# Patient Record
Sex: MALE | Race: WHITE
[De-identification: names, ages, dates, MRNs, and addresses within clinical notes are randomized per-mention and may not be internally consistent; named-entity substitution may affect disease eponyms.]

---

## 2019-11-06 ENCOUNTER — HOSPITAL ENCOUNTER (OUTPATIENT)
Dept: HOSPITAL 47 - ORWHC2ENDO | Age: 74
Discharge: HOME | End: 2019-11-06
Attending: INTERNAL MEDICINE
Payer: COMMERCIAL

## 2019-11-06 VITALS — DIASTOLIC BLOOD PRESSURE: 53 MMHG | SYSTOLIC BLOOD PRESSURE: 109 MMHG | HEART RATE: 61 BPM

## 2019-11-06 VITALS — BODY MASS INDEX: 23.2 KG/M2

## 2019-11-06 VITALS — TEMPERATURE: 97.3 F

## 2019-11-06 VITALS — RESPIRATION RATE: 24 BRPM

## 2019-11-06 DIAGNOSIS — Z80.8: ICD-10-CM

## 2019-11-06 DIAGNOSIS — C34.12: Primary | ICD-10-CM

## 2019-11-06 DIAGNOSIS — Z88.0: ICD-10-CM

## 2019-11-06 DIAGNOSIS — I73.9: ICD-10-CM

## 2019-11-06 DIAGNOSIS — Z82.3: ICD-10-CM

## 2019-11-06 DIAGNOSIS — Z88.2: ICD-10-CM

## 2019-11-06 DIAGNOSIS — Z79.899: ICD-10-CM

## 2019-11-06 DIAGNOSIS — Z82.0: ICD-10-CM

## 2019-11-06 DIAGNOSIS — Z79.82: ICD-10-CM

## 2019-11-06 DIAGNOSIS — I10: ICD-10-CM

## 2019-11-06 DIAGNOSIS — J44.9: ICD-10-CM

## 2019-11-06 DIAGNOSIS — Z95.5: ICD-10-CM

## 2019-11-06 DIAGNOSIS — I25.2: ICD-10-CM

## 2019-11-06 DIAGNOSIS — Z97.2: ICD-10-CM

## 2019-11-06 DIAGNOSIS — Z87.891: ICD-10-CM

## 2019-11-06 DIAGNOSIS — K44.9: ICD-10-CM

## 2019-11-06 DIAGNOSIS — Z79.02: ICD-10-CM

## 2019-11-06 DIAGNOSIS — I25.10: ICD-10-CM

## 2019-11-06 DIAGNOSIS — E78.5: ICD-10-CM

## 2019-11-06 DIAGNOSIS — Z85.828: ICD-10-CM

## 2019-11-06 PROCEDURE — 31645 BRNCHSC W/THER ASPIR 1ST: CPT

## 2019-11-06 PROCEDURE — 87498 ENTEROVIRUS PROBE&REVRS TRNS: CPT

## 2019-11-06 PROCEDURE — 87634 RSV DNA/RNA AMP PROBE: CPT

## 2019-11-06 PROCEDURE — 87116 MYCOBACTERIA CULTURE: CPT

## 2019-11-06 PROCEDURE — 87496 CYTOMEG DNA AMP PROBE: CPT

## 2019-11-06 PROCEDURE — 87252 VIRUS INOCULATION TISSUE: CPT

## 2019-11-06 PROCEDURE — 87102 FUNGUS ISOLATION CULTURE: CPT

## 2019-11-06 PROCEDURE — 87070 CULTURE OTHR SPECIMN AEROBIC: CPT

## 2019-11-06 PROCEDURE — 87205 SMEAR GRAM STAIN: CPT

## 2019-11-06 PROCEDURE — 87206 SMEAR FLUORESCENT/ACID STAI: CPT

## 2019-11-06 PROCEDURE — 31624 DX BRONCHOSCOPE/LAVAGE: CPT

## 2019-11-06 PROCEDURE — 87502 INFLUENZA DNA AMP PROBE: CPT

## 2019-11-06 PROCEDURE — 88108 CYTOPATH CONCENTRATE TECH: CPT

## 2019-11-06 PROCEDURE — 88305 TISSUE EXAM BY PATHOLOGIST: CPT

## 2019-11-06 PROCEDURE — 88341 IMHCHEM/IMCYTCHM EA ADD ANTB: CPT

## 2019-11-06 PROCEDURE — 94640 AIRWAY INHALATION TREATMENT: CPT

## 2019-11-06 PROCEDURE — 87798 DETECT AGENT NOS DNA AMP: CPT

## 2019-11-06 PROCEDURE — 87529 HSV DNA AMP PROBE: CPT

## 2019-11-06 PROCEDURE — 88342 IMHCHEM/IMCYTCHM 1ST ANTB: CPT

## 2019-11-06 NOTE — P.PCN
Date of Procedure: 11/06/19


Preoperative Diagnosis: 


EDISON opacity/infiltrating mass with cavitation


Postoperative Diagnosis: 


EDISON opacity/infiltrating mass with cavitation


Procedure(s) Performed: 


Flexible bronchoscopy and a BAL of the EDISON


Anesthesia: VIKAS


Surgeon: Juvenal Cha


Pathology: other


Condition: stable


Disposition: same day


Description of Procedure: 


This is a flexible bronchoscopy that was done in the endoscopy suite under 

conscious sedation.  The patient is a left upper lobe cavitating infiltrating 

mass/opacity.  A bronchioloalveolar lavage of the left upper lobe was done 

during the procedure





This procedure was done under conscious sedation.  Anesthetic agents was 

administered by anesthesia the bedside.  After achieving adequate sedation, the 

flexible bronchoscope was inserted through the left nostril was advanced with 

Doppler airway.  Examination of the posterior oropharynx, larynx, epiglottis and

vocal cords and all of the upper airway structures was done and they were all 

within normal limits.  Epiglottis was within normal and the cords were symmetric

and and sharp without any nodules or lesions.  There was some mobility without 

any abnormalities.  A total of 2 mL of 1% lidocaine was applied to the vocal 

cords and following that the bronchoscope was advanced into the upper trachea.  

Examination of the tracheal bronchial tree was done.  This involved the entire 

trachea, lydia, bilateral mainstem bronchi, right upper lobe bronchus, right 

upper lobe bronchus, right lower lobe bronchus, left upper lobe bronchus, left 

lower lobe bronchus along with his various segments and subsegments.  All of 

these were treated and within normal limits.  The bronchoscope was moved to the 

anterior segment of the left upper lobe and a bronchoalveolar lavage was done.  

A total of 80 mL of fluid was infused and 25 mL was aspirated.  This was done 

without any complication.  The aspirate was turbid and yellowish in color.  No 

other bronchial tumors.  No foreign bodies.  No lesions identified.  The 

bronchoscope was removed and the patient was transferred recovery in stable 

condition.  The samples will be sent for microbial analysis and cytology.  PET 

scan is to follow regarding this left upper lobe mass.

## 2019-11-09 ENCOUNTER — HOSPITAL ENCOUNTER (OUTPATIENT)
Dept: HOSPITAL 47 - RADPETMAIN | Age: 74
Discharge: HOME | End: 2019-11-09
Attending: INTERNAL MEDICINE
Payer: COMMERCIAL

## 2019-11-09 DIAGNOSIS — R91.8: Primary | ICD-10-CM

## 2019-11-09 PROCEDURE — 78815 PET IMAGE W/CT SKULL-THIGH: CPT

## 2019-11-11 NOTE — PE
EXAMINATION TYPE: PET CT fusion skull to thigh

 

DATE OF EXAM: 11/9/2019

 

COMPARISON: CT low dose dated 10/10/2019

 

HISTORY: Abnormal low dose CT chest dated 10/10/2019   

 

TECHNIQUE:  Following the intravenous administration of 13.11 mCi of F-18 FDG, whole body images are 
performed from the skull base to the midthigh.  Images are reviewed on the computer in the coronal, a
xial, and sagittal planes.  Reconstructed rotating images are created on independent workstation and 
reviewed on the computer.   A localization and attenuation correction CT is performed in conjunction 
with the PET scan.

 

SCAN: Initial staging

 

FINDINGS:

Mediastinal background: 1.56

Abdominal background: 2.67 

 

SKULL BASE AND NECK:  No suspicious uptake.

 

CHEST, MEDIASTINUM, AND HILAR REGION: There is severe background pulmonary emphysema. The known left 
upper lobe pulmonary mass with the most solid inferior portion measuring 3.9 cm on the prior examinat
ion (cavitary mass measuring 6.1 x 2.7 cm in total) has a maximum SUV of 12.7. Multiple satellite pul
monary nodules are again seen with the most spiculated measuring 1.1 cm and having an SUV of 10.2.

 

Prevascular lymph node measuring 9 mm on the prior exam and has an SUV of 3.76. Conglomeration of lym
ph nodes in the prevascular space and aorticopulmonary window measuring 3.4 x 2.1 cm on the prior exa
m has an SUV of 7.62.  Left perihilar conglomeration of lymph nodes measuring 1.5 cm in short axis ha
s an SUV of 5.12. Right paratracheal adenopathy with a maximum SUV of 4.05 measures 1.1 cm in short a
xis. No suspicious supraclavicular adenopathy or uptake.

 

ABDOMEN AND PELVIS: Multifocal uptake/excretion throughout the colon with a maximum SUV of 8.18 and t
he ascending colon. Correlate with any recent colonoscopy. No gross obstructing mass. Moderate degree
 colonic fecal stasis.

 

OSSEOUS STRUCTURES: Multifocal uptake of the spine, particularly of the lumbar spine is seen with max
imum SUV of 3.0 cm measured at L4. Findings are likely on a degenerative basis.

 

OTHER CT: The thoracic aorta is again noted to be mildly aneurysmal measuring 4.2 cm. Right main pulm
onary artery and left main pulmonary artery are slightly enlarged measuring 2.9 and 2.3 cm respective
ly. Very small hiatal hernia again seen. Moderate coronary calcifications. Probable punctate bone isl
and of the right iliac bone on image 198. No focal uptake. Extensive atherosclerosis of the abdominal
 aorta and its branches. Abdominal viscera are limited by lack of intravenous contrast and patient mo
tion. Bilateral subcentimeter renal cysts are present. Sigmoid colon demonstrates diffuse thickening 
in a long segment, possibly on the basis of chronic diverticulosis.

 

IMPRESSION: 

Cavitary mass compatible with neoplasm in the left upper lobe extending into the lingula measuring up
 to 6.1 cm with satellite ipsilateral pulmonary nodules. Contralateral and anterolateral mediastinal 
lymph nodes are pathologically enlarged and hypermetabolic compatible with metastasis. No evidence of
 supraclavicular adenopathy nor distant visceral metastasis. Bronchoscopy or percutaneous biopsy woul
d be recommended. Bronchoscopy could be favored as there is advanced underlying COPD in this patient 
high risk for percutaneous biopsy. Current staging of [id number] although biopsy has not been performed a
t this time.

## 2019-11-29 ENCOUNTER — HOSPITAL ENCOUNTER (OUTPATIENT)
Dept: HOSPITAL 47 - RADMRIMAIN | Age: 74
Discharge: HOME | End: 2019-11-29
Payer: COMMERCIAL

## 2019-11-29 DIAGNOSIS — G31.9: ICD-10-CM

## 2019-11-29 DIAGNOSIS — C34.12: Primary | ICD-10-CM

## 2019-11-29 PROCEDURE — 70553 MRI BRAIN STEM W/O & W/DYE: CPT

## 2019-11-29 NOTE — MR
EXAMINATION TYPE: MR brain wo/w con

 

DATE OF EXAM: 11/29/2019

 

COMPARISON: NONE

 

HISTORY: Malignant neoplasm of upper lobe

 

TECHNIQUE: 

Multiplanar, multisequence images of the brain and brainstem is performed without and with IV contras
t, utilizing 7.5 mL intravenous Gadavist .

 

FINDINGS: Diffusion weighted images demonstrate no evidence of a recent infarct or other diffusion ab
normality.  There is no worrisome extra-axial fluid collection. There is diffuse ventricular and sulc
al prominence. Mild low attenuation in the periventricular white matter is seen.

 

Midline structures demonstrate normal morphology.  The craniocervical junction appears within normal 
limits.  Post contrast images demonstrate some artifact but no definitive abnormal enhancing masses. 
The dural venous sinuses appear patent. Some dependent fluid left posterior ethmoid sinus otherwise t
he paranasal sinuses are clear. Globes are intact bilaterally.

 

IMPRESSION: 

1. No suspicious enhancement to suggest metastatic disease to the brain.

2. Moderate diffuse cerebral atrophy and left posterior ethmoid sinus disease.